# Patient Record
Sex: MALE | Race: WHITE
[De-identification: names, ages, dates, MRNs, and addresses within clinical notes are randomized per-mention and may not be internally consistent; named-entity substitution may affect disease eponyms.]

---

## 2017-11-10 ENCOUNTER — HOSPITAL ENCOUNTER (OUTPATIENT)
Dept: HOSPITAL 8 - OUT | Age: 52
End: 2017-11-10
Attending: SURGERY
Payer: COMMERCIAL

## 2017-11-10 VITALS — WEIGHT: 229.28 LBS | HEIGHT: 73 IN | BODY MASS INDEX: 30.39 KG/M2

## 2017-11-10 VITALS — SYSTOLIC BLOOD PRESSURE: 122 MMHG | DIASTOLIC BLOOD PRESSURE: 83 MMHG

## 2017-11-10 DIAGNOSIS — K42.9: Primary | ICD-10-CM

## 2017-11-10 PROCEDURE — 49585: CPT

## 2018-03-19 ENCOUNTER — HOSPITAL ENCOUNTER (OUTPATIENT)
Dept: RADIOLOGY | Facility: MEDICAL CENTER | Age: 53
End: 2018-03-19
Attending: FAMILY MEDICINE
Payer: COMMERCIAL

## 2018-03-19 ENCOUNTER — OFFICE VISIT (OUTPATIENT)
Dept: URGENT CARE | Facility: PHYSICIAN GROUP | Age: 53
End: 2018-03-19
Payer: COMMERCIAL

## 2018-03-19 VITALS
WEIGHT: 235 LBS | TEMPERATURE: 97.9 F | DIASTOLIC BLOOD PRESSURE: 84 MMHG | OXYGEN SATURATION: 95 % | BODY MASS INDEX: 31 KG/M2 | SYSTOLIC BLOOD PRESSURE: 142 MMHG | HEART RATE: 78 BPM

## 2018-03-19 DIAGNOSIS — G89.29 ACUTE EXACERBATION OF CHRONIC LOW BACK PAIN: ICD-10-CM

## 2018-03-19 DIAGNOSIS — M54.50 ACUTE EXACERBATION OF CHRONIC LOW BACK PAIN: ICD-10-CM

## 2018-03-19 PROCEDURE — 72100 X-RAY EXAM L-S SPINE 2/3 VWS: CPT

## 2018-03-19 PROCEDURE — 99213 OFFICE O/P EST LOW 20 MIN: CPT | Performed by: FAMILY MEDICINE

## 2018-03-19 RX ORDER — HYDROCODONE BITARTRATE AND ACETAMINOPHEN 7.5; 325 MG/1; MG/1
1-2 TABLET ORAL EVERY 6 HOURS PRN
COMMUNITY

## 2018-03-19 RX ORDER — KETOROLAC TROMETHAMINE 30 MG/ML
60 INJECTION, SOLUTION INTRAMUSCULAR; INTRAVENOUS ONCE
Status: COMPLETED | OUTPATIENT
Start: 2018-03-19 | End: 2018-03-19

## 2018-03-19 RX ADMIN — KETOROLAC TROMETHAMINE 60 MG: 30 INJECTION, SOLUTION INTRAMUSCULAR; INTRAVENOUS at 09:21

## 2018-03-19 NOTE — LETTER
March 19, 2018         Patient: Stephen Hay   YOB: 1965   Date of Visit: 3/19/2018           To Whom it May Concern:    Stephen Hay was seen in my clinic on 3/19/2018. He may return to work on Thursday.    If you have any questions or concerns, please don't hesitate to call.        Sincerely,           Jamaal Fam M.D.  Electronically Signed

## 2018-03-19 NOTE — PROGRESS NOTES
Back Pain  This is a new problem. The current episode started in the past 3 days after shoveling snow. The problem occurs constantly. The problem is unchanged. The pain is present in the lumbar spine. The quality of the pain is described as aching. The pain does not radiate. The pain is moderate. The symptoms are aggravated by position. Pertinent negatives include no bladder incontinence, bowel incontinence, dysuria, fever, headaches, numbness or weakness. Treatments tried: norco.  The treatment provided no relief.   He is currently under care of [facility] for lumbar disk herniation and gets regular PRETTY's    Social History   Substance Use Topics   • Smoking status: Current Every Day Smoker     Packs/day: 1.00     Years: 20.00   • Smokeless tobacco: Never Used   • Alcohol use No       Family hx was reviewed - no pertinent past family hx      Past medical history was unremarkable and not pertinent to current issue    Review of Systems   Constitutional: Negative for fever.   Gastrointestinal: Negative for bowel incontinence.   Genitourinary: Negative for bladder incontinence, dysuria, urgency and frequency.   Musculoskeletal: Positive for back pain.   Neurological: Negative for weakness, numbness and headaches.   All other systems reviewed and are negative.         Objective:     Blood pressure 142/84, pulse 78, temperature 36.6 °C (97.9 °F), weight 106.6 kg (235 lb), SpO2 95 %.    Physical Exam   Constitutional: pt is oriented to person, place, and time. Pt appears well-developed and well-nourished. No distress.   HENT:   Head: Normocephalic and atraumatic.   Eyes: EOM are normal. Pupils are equal, round, and reactive to light. No scleral icterus.   Neck: Normal range of motion. Neck supple. No thyromegaly present.   Cardiovascular: Normal rate, regular rhythm and normal heart sounds.    Pulmonary/Chest: Effort normal and breath sounds normal. No respiratory distress.  no wheezes.   no rales.  no tenderness.    Musculoskeletal: pt exhibits no edema.        Right knee: Normal.        Left knee: Normal.               Lumbar back: pt exhibits decreased range of motion, spasm and tenderness . Pt exhibits no bony tenderness, no swelling, no edema, no deformity  .     Neurological: patient is alert and oriented to person, place, and time. Patient has normal reflexes. No cranial nerve deficit. Patient exhibits normal muscle tone.  Unable to perform SLR due to discomfort        Skin: Skin is warm and dry. No rash noted. No erythema.   Psychiatric: patient has a normal mood and affect.  behavior is normal.   Nursing note and vitals reviewed.      3/19/2018 9:19 AM    HISTORY/REASON FOR EXAM: Pain Following snow shoveling 4 days ago, sacral and lower back pain    TECHNIQUE/ EXAM DESCRIPTION AND NUMBER OF VIEWS:  3 views of the lumbar spine.    COMPARISON: None.    FINDINGS:  There are 5 non-rib bearing lumbar type vertebral bodies.    No acute fracture is detected. The vertebral body heights are maintained.    The alignment of the lumbar spine is notable for trace L5/S1 retrolisthesis where there is moderate disc height loss and mild endplate spurring. There is lesser endplate spurring at other levels where there is preservation of disc heights    There is straightening of the normal lordosis    Atherosclerosis   Impression       No radiographic evidence of acute displaced fracture    L5/S1 moderate disc height loss and trace associated retrolisthesis    Straightening of the normal lordosis   Reading Provider Reading Date   Chele Gayle M.D. Mar 19, 2018               Assessment/Plan:       1. Acute exacerbation of chronic low back pain     X-rays were personally reviewed by myself.   There is no fracture, but loss of disk height.      No improvement after toradol inj    Advised to f/u with provider at [facility].      - ketorolac (TORADOL) injection 60 mg; 2 mL by Intramuscular route Once.

## 2020-02-05 ENCOUNTER — APPOINTMENT (RX ONLY)
Dept: URBAN - METROPOLITAN AREA CLINIC 22 | Facility: CLINIC | Age: 55
Setting detail: DERMATOLOGY
End: 2020-02-05

## 2020-02-05 DIAGNOSIS — L81.4 OTHER MELANIN HYPERPIGMENTATION: ICD-10-CM

## 2020-02-05 DIAGNOSIS — L91.8 OTHER HYPERTROPHIC DISORDERS OF THE SKIN: ICD-10-CM

## 2020-02-05 DIAGNOSIS — Z71.89 OTHER SPECIFIED COUNSELING: ICD-10-CM

## 2020-02-05 DIAGNOSIS — D22 MELANOCYTIC NEVI: ICD-10-CM

## 2020-02-05 DIAGNOSIS — D18.0 HEMANGIOMA: ICD-10-CM

## 2020-02-05 PROBLEM — D22.62 MELANOCYTIC NEVI OF LEFT UPPER LIMB, INCLUDING SHOULDER: Status: ACTIVE | Noted: 2020-02-05

## 2020-02-05 PROBLEM — D22.61 MELANOCYTIC NEVI OF RIGHT UPPER LIMB, INCLUDING SHOULDER: Status: ACTIVE | Noted: 2020-02-05

## 2020-02-05 PROBLEM — D22.5 MELANOCYTIC NEVI OF TRUNK: Status: ACTIVE | Noted: 2020-02-05

## 2020-02-05 PROBLEM — D18.01 HEMANGIOMA OF SKIN AND SUBCUTANEOUS TISSUE: Status: ACTIVE | Noted: 2020-02-05

## 2020-02-05 PROCEDURE — ? LIQUID NITROGEN

## 2020-02-05 PROCEDURE — 99203 OFFICE O/P NEW LOW 30 MIN: CPT

## 2020-02-05 PROCEDURE — ? COUNSELING

## 2020-02-05 ASSESSMENT — LOCATION SIMPLE DESCRIPTION DERM
LOCATION SIMPLE: RIGHT LOWER BACK
LOCATION SIMPLE: ABDOMEN
LOCATION SIMPLE: LEFT FOREARM
LOCATION SIMPLE: RIGHT FOREARM
LOCATION SIMPLE: LEFT CHEEK
LOCATION SIMPLE: RIGHT UPPER BACK

## 2020-02-05 ASSESSMENT — LOCATION DETAILED DESCRIPTION DERM
LOCATION DETAILED: EPIGASTRIC SKIN
LOCATION DETAILED: LEFT DISTAL DORSAL FOREARM
LOCATION DETAILED: LEFT PROXIMAL DORSAL FOREARM
LOCATION DETAILED: RIGHT PROXIMAL DORSAL FOREARM
LOCATION DETAILED: RIGHT SUPERIOR UPPER BACK
LOCATION DETAILED: LEFT SUPERIOR MEDIAL MALAR CHEEK
LOCATION DETAILED: RIGHT SUPERIOR MEDIAL MIDBACK

## 2020-02-05 ASSESSMENT — LOCATION ZONE DERM
LOCATION ZONE: TRUNK
LOCATION ZONE: ARM
LOCATION ZONE: FACE

## 2020-02-05 NOTE — PROCEDURE: LIQUID NITROGEN
Medical Necessity Information: It is in your best interest to select a reason for this procedure from the list below. All of these items fulfill various CMS LCD requirements except the new and changing color options.
Render Post-Care Instructions In Note?: no
Post-Care Instructions: I reviewed with the patient in detail post-care instructions. Patient is to wear sunprotection, and avoid picking at any of the treated lesions. Pt may apply Vaseline to crusted or scabbing areas.
Detail Level: Detailed
Medical Necessity Clause: This procedure was medically necessary because the lesions that were treated were:
Consent: The patient's consent was obtained including but not limited to risks of crusting, scabbing, blistering, scarring, darker or lighter pigmentary change, recurrence, incomplete removal and infection.
Duration Of Freeze Thaw-Cycle (Seconds): 0

## 2020-03-23 ENCOUNTER — APPOINTMENT (RX ONLY)
Dept: URBAN - METROPOLITAN AREA CLINIC 22 | Facility: CLINIC | Age: 55
Setting detail: DERMATOLOGY
End: 2020-03-23

## 2020-03-23 DIAGNOSIS — L85.3 XEROSIS CUTIS: ICD-10-CM

## 2020-03-23 DIAGNOSIS — Z71.89 OTHER SPECIFIED COUNSELING: ICD-10-CM

## 2020-03-23 DIAGNOSIS — L30.0 NUMMULAR DERMATITIS: ICD-10-CM

## 2020-03-23 PROCEDURE — ? TREATMENT REGIMEN

## 2020-03-23 PROCEDURE — 99213 OFFICE O/P EST LOW 20 MIN: CPT

## 2020-03-23 PROCEDURE — ? COUNSELING

## 2020-03-23 PROCEDURE — ? PRESCRIPTION

## 2020-03-23 RX ORDER — TRIAMCINOLONE ACETONIDE 1 MG/G
1 CREAM TOPICAL BID
Qty: 1 | Refills: 0 | Status: ERX | COMMUNITY
Start: 2020-03-23

## 2020-03-23 RX ADMIN — TRIAMCINOLONE ACETONIDE 1: 1 CREAM TOPICAL at 00:00

## 2020-03-23 ASSESSMENT — LOCATION DETAILED DESCRIPTION DERM
LOCATION DETAILED: LEFT INFERIOR UPPER BACK
LOCATION DETAILED: INFERIOR THORACIC SPINE

## 2020-03-23 ASSESSMENT — LOCATION SIMPLE DESCRIPTION DERM
LOCATION SIMPLE: UPPER BACK
LOCATION SIMPLE: LEFT UPPER BACK

## 2020-03-23 ASSESSMENT — LOCATION ZONE DERM: LOCATION ZONE: TRUNK

## 2022-02-23 ENCOUNTER — OFFICE VISIT (OUTPATIENT)
Dept: URGENT CARE | Facility: PHYSICIAN GROUP | Age: 57
End: 2022-02-23

## 2022-02-23 VITALS
WEIGHT: 233.8 LBS | TEMPERATURE: 98 F | HEIGHT: 73 IN | OXYGEN SATURATION: 96 % | BODY MASS INDEX: 30.99 KG/M2 | DIASTOLIC BLOOD PRESSURE: 98 MMHG | RESPIRATION RATE: 20 BRPM | HEART RATE: 89 BPM | SYSTOLIC BLOOD PRESSURE: 152 MMHG

## 2022-02-23 DIAGNOSIS — Z02.4 ENCOUNTER FOR CDL (COMMERCIAL DRIVING LICENSE) EXAM: ICD-10-CM

## 2022-02-23 PROCEDURE — 7100 PR DOT PHYSICAL: Performed by: PHYSICIAN ASSISTANT

## 2022-02-23 NOTE — PROGRESS NOTES
"Subjective:   Stephen Hay is a 56 y.o. male who presents for Employment Physical      HPI  Patient reports a history of high blood pressure treated with \"atorvastatin\".   For complete documentation please see DOT physical form scanned into chart        Medications:    • ATORVASTATIN CALCIUM PO  • HYDROcodone-acetaminophen  • ibuprofen Tabs    Allergies: Patient has no known allergies.    Problem List: Stephen Hay does not have any pertinent problems on file.    Surgical History:  Past Surgical History:   Procedure Laterality Date   • SHOULDER DECOMPRESSION ARTHROSCOPIC Left 10/28/2016    Procedure: SHOULDER DECOMPRESSION ARTHROSCOPIC W/DEBRIDEMENT, manipulation ;  Surgeon: Syed Panda M.D.;  Location: [facility];  Service:    • SHOULDER ARTHROSCOPY W/ ROTATOR CUFF REPAIR Left 10/28/2016    Procedure: SHOULDER ARTHROSCOPY distal clavicle ;  Surgeon: Syed Panda M.D.;  Location: [facility];  Service:    • SHOULDER ARTHROSCOPY W/ BICIPITAL TENODESIS REPAIR Left 10/28/2016    Procedure: SHOULDER ARTHROSCOPY W/ BICIPITAL  TENOTOMY;  Surgeon: Syed Panda M.D.;  Location: [facility];  Service:        Past Social Hx: Stephen Hay  reports that he has been smoking. He has a 20.00 pack-year smoking history. He has never used smokeless tobacco. He reports that he does not drink alcohol and does not use drugs.     Past Family Hx:  Stephen Hay family history is not on file.     Problem list, medications, and allergies reviewed by myself today in Epic.     Objective:     /98   Pulse 89   Temp 36.7 °C (98 °F) (Temporal)   Resp 20   Ht 1.854 m (6' 1\")   Wt 106 kg (233 lb 12.8 oz)   SpO2 96%   BMI 30.85 kg/m²     Physical Exam  For complete documentation please see DOT physical form scanned into chart      Diagnosis and associated orders:     1. Encounter for CDL (commercial driving license) exam          Comments/MDM:     • Patient reports a history of " "high blood pressure treated with \"atorvastatin\".  I discussed with patient this is not a blood pressure medication but a cholesterol medication.  Repeat BP passing.  Recommend follow-up with PCP.  Borderline passing for visual acuity test.  Recommend following up with an optometrist.  Patient verbalized understanding and agreed to plan.  For complete documentation please see DOT physical form scanned into chart  • Medical clearance 02/23/2022 through 02/23/2023          Please note that this dictation was created using voice recognition software. I have made a reasonable attempt to correct obvious errors, but I expect that there are errors of grammar and possibly content that I did not discover before finalizing the note.    This note was electronically signed by Minh Izquierdo PA-C  "

## 2023-01-17 ENCOUNTER — APPOINTMENT (RX ONLY)
Dept: URBAN - METROPOLITAN AREA CLINIC 22 | Facility: CLINIC | Age: 58
Setting detail: DERMATOLOGY
End: 2023-01-17

## 2023-01-17 DIAGNOSIS — B07.8 OTHER VIRAL WARTS: ICD-10-CM

## 2023-01-17 PROCEDURE — ? BENIGN DESTRUCTION

## 2023-01-17 PROCEDURE — ? COUNSELING

## 2023-01-17 PROCEDURE — ? ADDITIONAL NOTES

## 2023-01-17 PROCEDURE — 17110 DESTRUCTION B9 LES UP TO 14: CPT

## 2023-01-17 ASSESSMENT — LOCATION ZONE DERM: LOCATION ZONE: FINGER

## 2023-01-17 ASSESSMENT — LOCATION DETAILED DESCRIPTION DERM: LOCATION DETAILED: RIGHT INDEX FINGERTIP

## 2023-01-17 ASSESSMENT — LOCATION SIMPLE DESCRIPTION DERM: LOCATION SIMPLE: RIGHT INDEX FINGER

## 2023-01-17 NOTE — PROCEDURE: ADDITIONAL NOTES
Additional Notes: Recommend to apply otc salicylic acid
Detail Level: Simple
Render Risk Assessment In Note?: no

## 2023-01-17 NOTE — PROCEDURE: BENIGN DESTRUCTION
Include Z78.9 (Other Specified Conditions Influencing Health Status) As An Associated Diagnosis?: No
Post-Care Instructions: I reviewed with the patient in detail post-care instructions. Patient is to wear sunprotection, and avoid picking at any of the treated lesions. Pt may apply Vaseline to crusted or scabbing areas.
Duration Of Freeze Thaw-Cycle (Seconds): 5-10
Detail Level: Detailed
Number Of Freeze-Thaw Cycles: 2 freeze-thaw cycles
Medical Necessity Information: It is in your best interest to select a reason for this procedure from the list below. All of these items fulfill various CMS LCD requirements except the new and changing color options.
Consent: The patient's consent was obtained including but not limited to risks of crusting, scabbing, blistering, scarring, darker or lighter pigmentary change, recurrence, incomplete removal and infection.
Treatment Number (Will Not Render If 0): 0
Medical Necessity Clause: This procedure was medically necessary because the lesions that were treated were:

## 2024-07-10 ENCOUNTER — HOSPITAL ENCOUNTER (OUTPATIENT)
Dept: RADIOLOGY | Facility: MEDICAL CENTER | Age: 59
End: 2024-07-10
Attending: NURSE PRACTITIONER
Payer: COMMERCIAL

## 2024-07-10 DIAGNOSIS — M54.2 CERVICALGIA: ICD-10-CM

## 2024-07-10 PROCEDURE — 72050 X-RAY EXAM NECK SPINE 4/5VWS: CPT

## 2024-08-06 ENCOUNTER — HOSPITAL ENCOUNTER (OUTPATIENT)
Dept: LAB | Facility: MEDICAL CENTER | Age: 59
End: 2024-08-06
Attending: NURSE PRACTITIONER
Payer: COMMERCIAL

## 2024-08-06 LAB
ALBUMIN SERPL BCP-MCNC: 4.2 G/DL (ref 3.2–4.9)
ALBUMIN/GLOB SERPL: 1.6 G/DL
ALP SERPL-CCNC: 90 U/L (ref 30–99)
ALT SERPL-CCNC: 21 U/L (ref 2–50)
ANION GAP SERPL CALC-SCNC: 10 MMOL/L (ref 7–16)
APPEARANCE UR: CLEAR
AST SERPL-CCNC: 21 U/L (ref 12–45)
BASOPHILS # BLD AUTO: 0.5 % (ref 0–1.8)
BASOPHILS # BLD: 0.04 K/UL (ref 0–0.12)
BILIRUB SERPL-MCNC: 0.5 MG/DL (ref 0.1–1.5)
BILIRUB UR QL STRIP.AUTO: NEGATIVE
BUN SERPL-MCNC: 14 MG/DL (ref 8–22)
CALCIUM ALBUM COR SERPL-MCNC: 8.9 MG/DL (ref 8.5–10.5)
CALCIUM SERPL-MCNC: 9.1 MG/DL (ref 8.5–10.5)
CHLORIDE SERPL-SCNC: 104 MMOL/L (ref 96–112)
CHOLEST SERPL-MCNC: 130 MG/DL (ref 100–199)
CO2 SERPL-SCNC: 26 MMOL/L (ref 20–33)
COLOR UR: YELLOW
CREAT SERPL-MCNC: 0.68 MG/DL (ref 0.5–1.4)
CREAT UR-MCNC: 61.35 MG/DL
EOSINOPHIL # BLD AUTO: 0.23 K/UL (ref 0–0.51)
EOSINOPHIL NFR BLD: 2.7 % (ref 0–6.9)
ERYTHROCYTE [DISTWIDTH] IN BLOOD BY AUTOMATED COUNT: 46.1 FL (ref 35.9–50)
EST. AVERAGE GLUCOSE BLD GHB EST-MCNC: 235 MG/DL
GFR SERPLBLD CREATININE-BSD FMLA CKD-EPI: 108 ML/MIN/1.73 M 2
GLOBULIN SER CALC-MCNC: 2.7 G/DL (ref 1.9–3.5)
GLUCOSE SERPL-MCNC: 107 MG/DL (ref 65–99)
GLUCOSE UR STRIP.AUTO-MCNC: NEGATIVE MG/DL
HBA1C MFR BLD: 9.8 % (ref 4–5.6)
HCT VFR BLD AUTO: 44 % (ref 42–52)
HCV AB SER QL: NORMAL
HDLC SERPL-MCNC: 36 MG/DL
HGB BLD-MCNC: 14.6 G/DL (ref 14–18)
IMM GRANULOCYTES # BLD AUTO: 0.02 K/UL (ref 0–0.11)
IMM GRANULOCYTES NFR BLD AUTO: 0.2 % (ref 0–0.9)
KETONES UR STRIP.AUTO-MCNC: NEGATIVE MG/DL
LDLC SERPL CALC-MCNC: 74 MG/DL
LEUKOCYTE ESTERASE UR QL STRIP.AUTO: NEGATIVE
LYMPHOCYTES # BLD AUTO: 2.38 K/UL (ref 1–4.8)
LYMPHOCYTES NFR BLD: 27.5 % (ref 22–41)
MCH RBC QN AUTO: 29.9 PG (ref 27–33)
MCHC RBC AUTO-ENTMCNC: 33.2 G/DL (ref 32.3–36.5)
MCV RBC AUTO: 90 FL (ref 81.4–97.8)
MICRO URNS: NORMAL
MICROALBUMIN UR-MCNC: <1.2 MG/DL
MICROALBUMIN/CREAT UR: NORMAL MG/G (ref 0–30)
MONOCYTES # BLD AUTO: 0.71 K/UL (ref 0–0.85)
MONOCYTES NFR BLD AUTO: 8.2 % (ref 0–13.4)
NEUTROPHILS # BLD AUTO: 5.26 K/UL (ref 1.82–7.42)
NEUTROPHILS NFR BLD: 60.9 % (ref 44–72)
NITRITE UR QL STRIP.AUTO: NEGATIVE
NRBC # BLD AUTO: 0 K/UL
NRBC BLD-RTO: 0 /100 WBC (ref 0–0.2)
PH UR STRIP.AUTO: 6 [PH] (ref 5–8)
PLATELET # BLD AUTO: 248 K/UL (ref 164–446)
PMV BLD AUTO: 10.4 FL (ref 9–12.9)
POTASSIUM SERPL-SCNC: 4.4 MMOL/L (ref 3.6–5.5)
PROT SERPL-MCNC: 6.9 G/DL (ref 6–8.2)
PROT UR QL STRIP: NEGATIVE MG/DL
RBC # BLD AUTO: 4.89 M/UL (ref 4.7–6.1)
RBC UR QL AUTO: NEGATIVE
SODIUM SERPL-SCNC: 140 MMOL/L (ref 135–145)
SP GR UR STRIP.AUTO: 1.01
TRIGL SERPL-MCNC: 101 MG/DL (ref 0–149)
UROBILINOGEN UR STRIP.AUTO-MCNC: 0.2 MG/DL
WBC # BLD AUTO: 8.6 K/UL (ref 4.8–10.8)

## 2024-08-06 PROCEDURE — 80061 LIPID PANEL: CPT

## 2024-08-06 PROCEDURE — 82043 UR ALBUMIN QUANTITATIVE: CPT

## 2024-08-06 PROCEDURE — 83036 HEMOGLOBIN GLYCOSYLATED A1C: CPT

## 2024-08-06 PROCEDURE — 36415 COLL VENOUS BLD VENIPUNCTURE: CPT

## 2024-08-06 PROCEDURE — 86803 HEPATITIS C AB TEST: CPT

## 2024-08-06 PROCEDURE — 81003 URINALYSIS AUTO W/O SCOPE: CPT

## 2024-08-06 PROCEDURE — 82570 ASSAY OF URINE CREATININE: CPT

## 2024-08-06 PROCEDURE — 80053 COMPREHEN METABOLIC PANEL: CPT

## 2024-08-06 PROCEDURE — 85025 COMPLETE CBC W/AUTO DIFF WBC: CPT

## 2024-10-20 ENCOUNTER — HOSPITAL ENCOUNTER (OUTPATIENT)
Dept: RADIOLOGY | Facility: MEDICAL CENTER | Age: 59
End: 2024-10-20
Attending: NURSE PRACTITIONER
Payer: COMMERCIAL

## 2024-10-20 DIAGNOSIS — M54.2 CERVICALGIA: ICD-10-CM

## 2024-10-20 PROCEDURE — 72141 MRI NECK SPINE W/O DYE: CPT

## 2024-10-28 ENCOUNTER — APPOINTMENT (RX ONLY)
Dept: URBAN - METROPOLITAN AREA CLINIC 22 | Facility: CLINIC | Age: 59
Setting detail: DERMATOLOGY
End: 2024-10-28

## 2024-10-28 DIAGNOSIS — Z71.89 OTHER SPECIFIED COUNSELING: ICD-10-CM

## 2024-10-28 DIAGNOSIS — B07.8 OTHER VIRAL WARTS: ICD-10-CM

## 2024-10-28 PROCEDURE — ? BENIGN DESTRUCTION

## 2024-10-28 PROCEDURE — 17110 DESTRUCTION B9 LES UP TO 14: CPT

## 2024-10-28 PROCEDURE — ? PRESCRIPTION

## 2024-10-28 PROCEDURE — ? COUNSELING

## 2024-10-28 RX ORDER — IMIQUIMOD 12.5 MG/.25G
CREAM TOPICAL QOD
Qty: 12 | Refills: 1 | Status: ERX | COMMUNITY
Start: 2024-10-28

## 2024-10-28 RX ADMIN — IMIQUIMOD: 12.5 CREAM TOPICAL at 00:00

## 2024-10-28 ASSESSMENT — LOCATION DETAILED DESCRIPTION DERM: LOCATION DETAILED: RIGHT INDEX FINGERTIP

## 2024-10-28 ASSESSMENT — LOCATION SIMPLE DESCRIPTION DERM: LOCATION SIMPLE: RIGHT INDEX FINGER

## 2024-10-28 ASSESSMENT — LOCATION ZONE DERM: LOCATION ZONE: FINGER

## 2024-11-25 ENCOUNTER — APPOINTMENT (RX ONLY)
Dept: URBAN - METROPOLITAN AREA CLINIC 22 | Facility: CLINIC | Age: 59
Setting detail: DERMATOLOGY
End: 2024-11-25

## 2024-11-25 DIAGNOSIS — B07.8 OTHER VIRAL WARTS: ICD-10-CM

## 2024-11-25 PROCEDURE — 17110 DESTRUCTION B9 LES UP TO 14: CPT

## 2024-11-25 PROCEDURE — ? LIQUID NITROGEN

## 2024-11-25 ASSESSMENT — LOCATION ZONE DERM: LOCATION ZONE: FINGER

## 2024-11-25 ASSESSMENT — LOCATION DETAILED DESCRIPTION DERM
LOCATION DETAILED: RIGHT DISTAL DORSAL INDEX FINGER
LOCATION DETAILED: RIGHT INDEX DISTAL INTERPHALANGEAL JOINT

## 2024-11-25 ASSESSMENT — LOCATION SIMPLE DESCRIPTION DERM: LOCATION SIMPLE: RIGHT INDEX FINGER

## 2024-11-25 NOTE — HPI: WARTS (VERRUCA)
How Severe Are Your Warts?: moderate
Is This A New Presentation, Or A Follow-Up?: Follow Up [facility]

## 2024-11-25 NOTE — PROCEDURE: LIQUID NITROGEN
Application Tool (Optional): Liquid Nitrogen Sprayer
Number Of Freeze-Thaw Cycles: 3 freeze-thaw cycles
Post-Care Instructions: I reviewed with the patient in detail post-care instructions. Patient is to wear sunprotection, and avoid picking at any of the treated lesions. Pt may apply Vaseline to crusted or scabbing areas.
Medical Necessity Clause: This procedure was medically necessary because the lesions that were treated were:
Add 52 Modifier (Optional): no
Show Aperture Variable?: Yes
Detail Level: Detailed
Medical Necessity Information: It is in your best interest to select a reason for this procedure from the list below. All of these items fulfill various CMS LCD requirements except the new and changing color options.
Consent: The patient's consent was obtained including but not limited to risks of crusting, scabbing, blistering, scarring, darker or lighter pigmentary change, recurrence, incomplete removal and infection.
Duration Of Freeze Thaw-Cycle (Seconds): 3
Spray Paint Text: The liquid nitrogen was applied to the skin utilizing a spray paint frosting technique.